# Patient Record
Sex: FEMALE | Race: OTHER | URBAN - METROPOLITAN AREA
[De-identification: names, ages, dates, MRNs, and addresses within clinical notes are randomized per-mention and may not be internally consistent; named-entity substitution may affect disease eponyms.]

---

## 2022-03-10 ENCOUNTER — EMERGENCY (EMERGENCY)
Facility: HOSPITAL | Age: 47
LOS: 1 days | Discharge: LEFT BEFORE DISCHARGE | End: 2022-03-10
Payer: SELF-PAY

## 2022-03-10 VITALS
OXYGEN SATURATION: 100 % | TEMPERATURE: 98 F | SYSTOLIC BLOOD PRESSURE: 185 MMHG | HEART RATE: 85 BPM | DIASTOLIC BLOOD PRESSURE: 98 MMHG | RESPIRATION RATE: 20 BRPM | HEIGHT: 64 IN | WEIGHT: 179.9 LBS

## 2022-03-10 PROCEDURE — L9991: CPT

## 2024-07-10 ENCOUNTER — APPOINTMENT (OUTPATIENT)
Dept: ORTHOPEDIC SURGERY | Facility: CLINIC | Age: 49
End: 2024-07-10
Payer: COMMERCIAL

## 2024-07-10 ENCOUNTER — APPOINTMENT (OUTPATIENT)
Dept: PULMONOLOGY | Facility: CLINIC | Age: 49
End: 2024-07-10
Payer: COMMERCIAL

## 2024-07-10 ENCOUNTER — OUTPATIENT (OUTPATIENT)
Dept: OUTPATIENT SERVICES | Facility: HOSPITAL | Age: 49
LOS: 1 days | End: 2024-07-10
Payer: COMMERCIAL

## 2024-07-10 ENCOUNTER — LABORATORY RESULT (OUTPATIENT)
Age: 49
End: 2024-07-10

## 2024-07-10 ENCOUNTER — NON-APPOINTMENT (OUTPATIENT)
Age: 49
End: 2024-07-10

## 2024-07-10 VITALS
WEIGHT: 186 LBS | RESPIRATION RATE: 16 BRPM | OXYGEN SATURATION: 97 % | HEIGHT: 65.75 IN | DIASTOLIC BLOOD PRESSURE: 78 MMHG | BODY MASS INDEX: 30.25 KG/M2 | SYSTOLIC BLOOD PRESSURE: 122 MMHG | HEART RATE: 85 BPM

## 2024-07-10 VITALS — HEIGHT: 66 IN | WEIGHT: 190 LBS | BODY MASS INDEX: 30.53 KG/M2

## 2024-07-10 DIAGNOSIS — R06.2 WHEEZING: ICD-10-CM

## 2024-07-10 DIAGNOSIS — M72.2 PLANTAR FASCIAL FIBROMATOSIS: ICD-10-CM

## 2024-07-10 PROBLEM — Z00.00 ENCOUNTER FOR PREVENTIVE HEALTH EXAMINATION: Status: ACTIVE | Noted: 2024-07-10

## 2024-07-10 PROCEDURE — 73630 X-RAY EXAM OF FOOT: CPT | Mod: 50

## 2024-07-10 PROCEDURE — 71046 X-RAY EXAM CHEST 2 VIEWS: CPT | Mod: 26

## 2024-07-10 PROCEDURE — 99205 OFFICE O/P NEW HI 60 MIN: CPT | Mod: 25

## 2024-07-10 PROCEDURE — 94010 BREATHING CAPACITY TEST: CPT

## 2024-07-10 PROCEDURE — 99204 OFFICE O/P NEW MOD 45 MIN: CPT

## 2024-07-10 RX ORDER — OLMESARTAN MEDOXOMIL 40 MG/1
TABLET, FILM COATED ORAL
Refills: 0 | Status: ACTIVE | COMMUNITY

## 2024-07-10 RX ORDER — FLUTICASONE FUROATE AND VILANTEROL TRIFENATATE 100; 25 UG/1; UG/1
100-25 POWDER RESPIRATORY (INHALATION)
Qty: 1 | Refills: 5 | Status: ACTIVE | COMMUNITY
Start: 2024-07-10 | End: 1900-01-01

## 2024-07-10 RX ORDER — ALBUTEROL SULFATE 90 UG/1
108 (90 BASE) INHALANT RESPIRATORY (INHALATION)
Qty: 1 | Refills: 5 | Status: ACTIVE | COMMUNITY
Start: 2024-07-10 | End: 1900-01-01

## 2024-07-15 ENCOUNTER — NON-APPOINTMENT (OUTPATIENT)
Age: 49
End: 2024-07-15

## 2024-07-16 LAB
A ALTERNATA IGE QN: <0.1 KUA/L
A FUMIGATUS IGE QN: <0.1 KUA/L
ALBUMIN SERPL ELPH-MCNC: 4.6 G/DL
ALT SERPL-CCNC: 35 U/L
ANION GAP SERPL CALC-SCNC: 13 MMOL/L
AST SERPL-CCNC: 23 U/L
BILIRUB SERPL-MCNC: 0.2 MG/DL
BUN SERPL-MCNC: 8 MG/DL
C ALBICANS IGE QN: <0.1 KUA/L
C HERBARUM IGE QN: <0.1 KUA/L
CALCIUM SERPL-MCNC: 9.8 MG/DL
CAT DANDER IGE QN: <0.1 KUA/L
CHLORIDE SERPL-SCNC: 106 MMOL/L
CO2 SERPL-SCNC: 23 MMOL/L
COMMON RAGWEED IGE QN: <0.1 KUA/L
CREAT SERPL-MCNC: 0.53 MG/DL
D FARINAE IGE QN: <0.1 KUA/L
DEPRECATED A ALTERNATA IGE RAST QL: 0 (ref 0–?)
DEPRECATED A FUMIGATUS IGE RAST QL: 0 (ref 0–?)
DEPRECATED C ALBICANS IGE RAST QL: 0
DEPRECATED C HERBARUM IGE RAST QL: 0 (ref 0–?)
DEPRECATED CAT DANDER IGE RAST QL: 0 (ref 0–?)
DEPRECATED COMMON RAGWEED IGE RAST QL: 0 (ref 0–?)
DEPRECATED D FARINAE IGE RAST QL: 0 (ref 0–?)
DEPRECATED D PTERONYSS IGE RAST QL: 0 (ref 0–?)
DEPRECATED DOG DANDER IGE RAST QL: 2 (ref 0–?)
DEPRECATED M RACEMOSUS IGE RAST QL: 0
DEPRECATED ROACH IGE RAST QL: 0 (ref 0–?)
DEPRECATED TIMOTHY IGE RAST QL: 0 (ref 0–?)
EGFR: 113 ML/MIN/1.73M2
GLUCOSE SERPL-MCNC: 94 MG/DL
M RACEMOSUS IGE QN: <0.1 KUA/L
POTASSIUM SERPL-SCNC: 4 MMOL/L
PROT SERPL-MCNC: 7.1 G/DL
ROACH IGE QN: <0.1 KUA/L
SODIUM SERPL-SCNC: 141 MMOL/L
TOTAL IGE SMQN RAST: 78 KU/L
WHITE OAK IGE QN: <0.1 KUA/L

## 2024-07-17 ENCOUNTER — APPOINTMENT (OUTPATIENT)
Dept: MRI IMAGING | Facility: CLINIC | Age: 49
End: 2024-07-17

## 2024-09-30 ENCOUNTER — APPOINTMENT (OUTPATIENT)
Dept: PULMONOLOGY | Facility: CLINIC | Age: 49
End: 2024-09-30